# Patient Record
Sex: MALE | Race: WHITE | Employment: OTHER | URBAN - METROPOLITAN AREA
[De-identification: names, ages, dates, MRNs, and addresses within clinical notes are randomized per-mention and may not be internally consistent; named-entity substitution may affect disease eponyms.]

---

## 2017-01-01 ENCOUNTER — HOSPITAL ENCOUNTER (OUTPATIENT)
Dept: LAB | Age: 67
Discharge: HOME | End: 2017-02-14
Attending: INTERNAL MEDICINE
Payer: COMMERCIAL

## 2017-01-01 ENCOUNTER — HOSPITAL ENCOUNTER (INPATIENT)
Age: 67
LOS: 3 days | End: 2017-04-21
Attending: INTERNAL MEDICINE | Admitting: INTERNAL MEDICINE

## 2017-01-01 ENCOUNTER — HOSPICE ADMISSION (OUTPATIENT)
Dept: HOSPICE | Facility: HOSPICE | Age: 67
End: 2017-01-01
Payer: COMMERCIAL

## 2017-01-01 VITALS
TEMPERATURE: 96 F | HEART RATE: 101 BPM | RESPIRATION RATE: 10 BRPM | SYSTOLIC BLOOD PRESSURE: 89 MMHG | DIASTOLIC BLOOD PRESSURE: 64 MMHG

## 2017-01-01 DIAGNOSIS — C34.90 SMALL CELL CARCINOMA OF LUNG, UNSPECIFIED LATERALITY: ICD-10-CM

## 2017-01-01 LAB
ACID FAST STN SPEC: NORMAL
COLLECTION COMMENT, COLCM: NORMAL
MYCOBACTERIUM SPEC QL CULT: NEGATIVE
SPECIMEN PREPARATION: NORMAL
SPECIMEN SOURCE: NORMAL

## 2017-01-01 PROCEDURE — 74011250636 HC RX REV CODE- 250/636: Performed by: NURSE PRACTITIONER

## 2017-01-01 PROCEDURE — 74011000250 HC RX REV CODE- 250: Performed by: NURSE PRACTITIONER

## 2017-01-01 PROCEDURE — 74011250636 HC RX REV CODE- 250/636

## 2017-01-01 PROCEDURE — 87116 MYCOBACTERIA CULTURE: CPT | Performed by: INTERNAL MEDICINE

## 2017-01-01 PROCEDURE — 99231 SBSQ HOSP IP/OBS SF/LOW 25: CPT | Performed by: INTERNAL MEDICINE

## 2017-01-01 PROCEDURE — 74011250636 HC RX REV CODE- 250/636: Performed by: INTERNAL MEDICINE

## 2017-01-01 PROCEDURE — 0656 HSPC GENERAL INPATIENT

## 2017-01-01 PROCEDURE — 3336500001 HSPC ELECTION

## 2017-01-01 PROCEDURE — 36415 COLL VENOUS BLD VENIPUNCTURE: CPT | Performed by: INTERNAL MEDICINE

## 2017-01-01 RX ORDER — SODIUM CHLORIDE 0.9 % (FLUSH) 0.9 %
3 SYRINGE (ML) INJECTION AS NEEDED
Status: DISCONTINUED | OUTPATIENT
Start: 2017-01-01 | End: 2017-01-01

## 2017-01-01 RX ORDER — CLONAZEPAM 1 MG/1
1 TABLET ORAL
COMMUNITY

## 2017-01-01 RX ORDER — LEVOFLOXACIN 500 MG/1
500 TABLET, FILM COATED ORAL DAILY
Refills: 1 | COMMUNITY
Start: 2017-01-01

## 2017-01-01 RX ORDER — LORAZEPAM 2 MG/ML
1 INJECTION INTRAMUSCULAR
Status: DISCONTINUED | OUTPATIENT
Start: 2017-01-01 | End: 2017-01-01

## 2017-01-01 RX ORDER — LORAZEPAM 2 MG/ML
1 INJECTION INTRAMUSCULAR
Status: DISCONTINUED | OUTPATIENT
Start: 2017-01-01 | End: 2017-01-01 | Stop reason: HOSPADM

## 2017-01-01 RX ORDER — SODIUM CHLORIDE 0.9 % (FLUSH) 0.9 %
3 SYRINGE (ML) INJECTION EVERY 12 HOURS
Status: DISCONTINUED | OUTPATIENT
Start: 2017-01-01 | End: 2017-01-01

## 2017-01-01 RX ORDER — LORAZEPAM 1 MG/1
1-2 TABLET ORAL
Refills: 1 | COMMUNITY
Start: 2017-01-01

## 2017-01-01 RX ORDER — MORPHINE SULFATE 2 MG/ML
2 INJECTION, SOLUTION INTRAMUSCULAR; INTRAVENOUS
Status: DISCONTINUED | OUTPATIENT
Start: 2017-01-01 | End: 2017-01-01

## 2017-01-01 RX ORDER — MORPHINE SULFATE 30 MG/1
30 TABLET, FILM COATED, EXTENDED RELEASE ORAL 3 TIMES DAILY
Refills: 0 | COMMUNITY
Start: 2017-01-01

## 2017-01-01 RX ORDER — MORPHINE SULFATE 20 MG/ML
.25-1 SOLUTION ORAL
Refills: 0 | COMMUNITY
Start: 2017-01-01

## 2017-01-01 RX ORDER — MORPHINE SULFATE 2 MG/ML
2 INJECTION, SOLUTION INTRAMUSCULAR; INTRAVENOUS
Status: DISCONTINUED | OUTPATIENT
Start: 2017-01-01 | End: 2017-01-01 | Stop reason: HOSPADM

## 2017-01-01 RX ORDER — HALOPERIDOL 5 MG/ML
2 INJECTION INTRAMUSCULAR
Status: DISCONTINUED | OUTPATIENT
Start: 2017-01-01 | End: 2017-01-01

## 2017-01-01 RX ORDER — SIMVASTATIN 40 MG/1
40 TABLET, FILM COATED ORAL
Refills: 3 | COMMUNITY
Start: 2017-01-01

## 2017-01-01 RX ORDER — HYOSCYAMINE SULFATE 0.125 MG
1-2 TABLET ORAL
Refills: 1 | COMMUNITY
Start: 2017-01-01

## 2017-01-01 RX ORDER — ACETAMINOPHEN 650 MG/1
650 SUPPOSITORY RECTAL
Status: DISCONTINUED | OUTPATIENT
Start: 2017-01-01 | End: 2017-01-01 | Stop reason: HOSPADM

## 2017-01-01 RX ORDER — HALOPERIDOL 5 MG/ML
4 INJECTION INTRAMUSCULAR
Status: DISCONTINUED | OUTPATIENT
Start: 2017-01-01 | End: 2017-01-01 | Stop reason: HOSPADM

## 2017-01-01 RX ORDER — FACIAL-BODY WIPES
10 EACH TOPICAL AS NEEDED
Status: DISCONTINUED | OUTPATIENT
Start: 2017-01-01 | End: 2017-01-01 | Stop reason: HOSPADM

## 2017-01-01 RX ORDER — MORPHINE SULFATE 4 MG/ML
4 INJECTION, SOLUTION INTRAMUSCULAR; INTRAVENOUS
Status: DISCONTINUED | OUTPATIENT
Start: 2017-01-01 | End: 2017-01-01

## 2017-01-01 RX ORDER — METFORMIN HYDROCHLORIDE 500 MG/1
500 TABLET ORAL
COMMUNITY

## 2017-01-01 RX ORDER — MORPHINE SULFATE 15 MG/1
15 TABLET, FILM COATED, EXTENDED RELEASE ORAL DAILY
COMMUNITY

## 2017-01-01 RX ORDER — TRAZODONE HYDROCHLORIDE 100 MG/1
100 TABLET ORAL
COMMUNITY

## 2017-01-01 RX ORDER — NITROGLYCERIN 0.4 MG/1
0.4 TABLET SUBLINGUAL
COMMUNITY

## 2017-01-01 RX ORDER — MORPHINE SULFATE 4 MG/ML
4 INJECTION, SOLUTION INTRAMUSCULAR; INTRAVENOUS ONCE
Status: COMPLETED | OUTPATIENT
Start: 2017-01-01 | End: 2017-01-01

## 2017-01-01 RX ORDER — HALOPERIDOL 5 MG/ML
INJECTION INTRAMUSCULAR
Status: COMPLETED
Start: 2017-01-01 | End: 2017-01-01

## 2017-01-01 RX ORDER — CLOPIDOGREL BISULFATE 75 MG/1
75 TABLET ORAL DAILY
COMMUNITY

## 2017-01-01 RX ORDER — IPRATROPIUM BROMIDE 0.5 MG/2.5ML
0.5 SOLUTION RESPIRATORY (INHALATION)
Status: DISCONTINUED | OUTPATIENT
Start: 2017-01-01 | End: 2017-01-01 | Stop reason: HOSPADM

## 2017-01-01 RX ORDER — MORPHINE SULFATE 2 MG/ML
INJECTION, SOLUTION INTRAMUSCULAR; INTRAVENOUS
Status: COMPLETED
Start: 2017-01-01 | End: 2017-01-01

## 2017-01-01 RX ORDER — ASPIRIN 325 MG
325 TABLET ORAL DAILY
COMMUNITY

## 2017-01-01 RX ORDER — MORPHINE SULFATE 4 MG/ML
4 INJECTION, SOLUTION INTRAMUSCULAR; INTRAVENOUS
Status: DISCONTINUED | OUTPATIENT
Start: 2017-01-01 | End: 2017-01-01 | Stop reason: HOSPADM

## 2017-01-01 RX ORDER — GLYCOPYRROLATE 0.2 MG/ML
0.2 INJECTION INTRAMUSCULAR; INTRAVENOUS
Status: DISCONTINUED | OUTPATIENT
Start: 2017-01-01 | End: 2017-01-01 | Stop reason: HOSPADM

## 2017-01-01 RX ORDER — PROMETHAZINE HYDROCHLORIDE 25 MG/1
25 TABLET ORAL
COMMUNITY

## 2017-01-01 RX ORDER — HALOPERIDOL 5 MG/ML
4 INJECTION INTRAMUSCULAR EVERY 6 HOURS
Status: DISCONTINUED | OUTPATIENT
Start: 2017-01-01 | End: 2017-01-01 | Stop reason: HOSPADM

## 2017-01-01 RX ORDER — BISACODYL 5 MG
1-2 TABLET, DELAYED RELEASE (ENTERIC COATED) ORAL
Refills: 0 | COMMUNITY
Start: 2017-01-01

## 2017-01-01 RX ADMIN — MORPHINE SULFATE 2 MG: 2 INJECTION, SOLUTION INTRAMUSCULAR; INTRAVENOUS at 12:01

## 2017-01-01 RX ADMIN — LORAZEPAM 1 MG: 2 INJECTION INTRAMUSCULAR; INTRAVENOUS at 19:31

## 2017-01-01 RX ADMIN — HALOPERIDOL LACTATE 2 MG: 5 INJECTION, SOLUTION INTRAMUSCULAR at 01:09

## 2017-01-01 RX ADMIN — HALOPERIDOL LACTATE 2 MG: 5 INJECTION, SOLUTION INTRAMUSCULAR at 09:34

## 2017-01-01 RX ADMIN — MORPHINE SULFATE 4 MG: 4 INJECTION, SOLUTION INTRAMUSCULAR; INTRAVENOUS at 14:22

## 2017-01-01 RX ADMIN — HALOPERIDOL LACTATE 4 MG: 5 INJECTION, SOLUTION INTRAMUSCULAR at 17:50

## 2017-01-01 RX ADMIN — LORAZEPAM 1 MG: 2 INJECTION INTRAMUSCULAR; INTRAVENOUS at 12:08

## 2017-01-01 RX ADMIN — HALOPERIDOL LACTATE 2 MG: 5 INJECTION, SOLUTION INTRAMUSCULAR at 19:12

## 2017-01-01 RX ADMIN — MORPHINE SULFATE 4 MG: 4 INJECTION, SOLUTION INTRAMUSCULAR; INTRAVENOUS at 19:31

## 2017-01-01 RX ADMIN — HALOPERIDOL LACTATE 4 MG: 5 INJECTION, SOLUTION INTRAMUSCULAR at 12:00

## 2017-01-01 RX ADMIN — HALOPERIDOL LACTATE 2 MG: 5 INJECTION, SOLUTION INTRAMUSCULAR at 14:57

## 2017-01-01 RX ADMIN — MORPHINE SULFATE 2 MG: 2 INJECTION, SOLUTION INTRAMUSCULAR; INTRAVENOUS at 13:42

## 2017-01-01 RX ADMIN — GLYCOPYRROLATE 0.2 MG: 0.2 INJECTION INTRAMUSCULAR; INTRAVENOUS at 11:53

## 2017-01-01 RX ADMIN — MORPHINE SULFATE 2 MG: 2 INJECTION, SOLUTION INTRAMUSCULAR; INTRAVENOUS at 14:22

## 2017-01-01 RX ADMIN — HALOPERIDOL LACTATE 2 MG: 5 INJECTION, SOLUTION INTRAMUSCULAR at 05:41

## 2017-01-01 RX ADMIN — MORPHINE SULFATE 4 MG: 4 INJECTION, SOLUTION INTRAMUSCULAR; INTRAVENOUS at 03:38

## 2017-01-01 RX ADMIN — MORPHINE SULFATE 4 MG: 4 INJECTION, SOLUTION INTRAMUSCULAR; INTRAVENOUS at 07:26

## 2017-01-01 RX ADMIN — HALOPERIDOL LACTATE 2 MG: 5 INJECTION, SOLUTION INTRAMUSCULAR at 17:49

## 2017-01-01 RX ADMIN — MORPHINE SULFATE 4 MG: 4 INJECTION, SOLUTION INTRAMUSCULAR; INTRAVENOUS at 13:42

## 2017-01-01 RX ADMIN — MORPHINE SULFATE 4 MG: 4 INJECTION, SOLUTION INTRAMUSCULAR; INTRAVENOUS at 21:33

## 2017-01-01 RX ADMIN — MORPHINE SULFATE 4 MG: 4 INJECTION, SOLUTION INTRAMUSCULAR; INTRAVENOUS at 08:44

## 2017-01-01 RX ADMIN — HALOPERIDOL LACTATE 2 MG: 5 INJECTION, SOLUTION INTRAMUSCULAR at 11:53

## 2017-01-01 RX ADMIN — MORPHINE SULFATE 4 MG: 4 INJECTION, SOLUTION INTRAMUSCULAR; INTRAVENOUS at 21:43

## 2017-01-01 RX ADMIN — HALOPERIDOL LACTATE 2 MG: 5 INJECTION, SOLUTION INTRAMUSCULAR at 06:14

## 2017-01-01 RX ADMIN — MORPHINE SULFATE 4 MG: 4 INJECTION, SOLUTION INTRAMUSCULAR; INTRAVENOUS at 09:35

## 2017-01-01 RX ADMIN — MORPHINE SULFATE 4 MG: 4 INJECTION, SOLUTION INTRAMUSCULAR; INTRAVENOUS at 08:00

## 2017-01-01 RX ADMIN — MORPHINE SULFATE 4 MG: 4 INJECTION, SOLUTION INTRAMUSCULAR; INTRAVENOUS at 12:44

## 2017-01-01 RX ADMIN — MORPHINE SULFATE 4 MG: 4 INJECTION, SOLUTION INTRAMUSCULAR; INTRAVENOUS at 22:39

## 2017-01-01 RX ADMIN — MORPHINE SULFATE 4 MG: 4 INJECTION, SOLUTION INTRAMUSCULAR; INTRAVENOUS at 13:48

## 2017-01-01 RX ADMIN — LORAZEPAM 1 MG: 2 INJECTION INTRAMUSCULAR; INTRAVENOUS at 19:13

## 2017-01-01 RX ADMIN — MORPHINE SULFATE 4 MG: 4 INJECTION, SOLUTION INTRAMUSCULAR; INTRAVENOUS at 12:59

## 2017-01-01 RX ADMIN — MORPHINE SULFATE 4 MG: 4 INJECTION, SOLUTION INTRAMUSCULAR; INTRAVENOUS at 15:08

## 2017-01-01 RX ADMIN — MORPHINE SULFATE 4 MG: 4 INJECTION, SOLUTION INTRAMUSCULAR; INTRAVENOUS at 05:41

## 2017-01-01 RX ADMIN — HALOPERIDOL LACTATE 2 MG: 5 INJECTION, SOLUTION INTRAMUSCULAR at 21:33

## 2017-01-01 RX ADMIN — MORPHINE SULFATE 4 MG: 4 INJECTION, SOLUTION INTRAMUSCULAR; INTRAVENOUS at 16:14

## 2017-01-01 RX ADMIN — HALOPERIDOL LACTATE 2 MG: 5 INJECTION, SOLUTION INTRAMUSCULAR at 07:59

## 2017-01-01 RX ADMIN — MORPHINE SULFATE 2 MG: 2 INJECTION, SOLUTION INTRAMUSCULAR; INTRAVENOUS at 11:07

## 2017-01-01 RX ADMIN — MORPHINE SULFATE 4 MG: 4 INJECTION, SOLUTION INTRAMUSCULAR; INTRAVENOUS at 01:09

## 2017-01-01 RX ADMIN — HALOPERIDOL LACTATE 2 MG: 5 INJECTION, SOLUTION INTRAMUSCULAR at 11:12

## 2017-01-01 RX ADMIN — HALOPERIDOL LACTATE 4 MG: 5 INJECTION, SOLUTION INTRAMUSCULAR at 20:39

## 2017-01-01 RX ADMIN — LORAZEPAM 1 MG: 2 INJECTION INTRAMUSCULAR; INTRAVENOUS at 01:55

## 2017-01-01 RX ADMIN — MORPHINE SULFATE 4 MG: 4 INJECTION, SOLUTION INTRAMUSCULAR; INTRAVENOUS at 04:39

## 2017-01-01 RX ADMIN — MORPHINE SULFATE 4 MG: 4 INJECTION, SOLUTION INTRAMUSCULAR; INTRAVENOUS at 17:47

## 2017-01-01 RX ADMIN — MORPHINE SULFATE 2 MG: 2 INJECTION, SOLUTION INTRAMUSCULAR; INTRAVENOUS at 16:13

## 2017-01-01 RX ADMIN — MORPHINE SULFATE 4 MG: 4 INJECTION, SOLUTION INTRAMUSCULAR; INTRAVENOUS at 12:10

## 2017-01-01 RX ADMIN — LORAZEPAM 1 MG: 2 INJECTION INTRAMUSCULAR; INTRAVENOUS at 13:18

## 2017-01-01 RX ADMIN — HALOPERIDOL LACTATE 2 MG: 5 INJECTION, SOLUTION INTRAMUSCULAR at 12:58

## 2017-01-01 RX ADMIN — LORAZEPAM 1 MG: 2 INJECTION INTRAMUSCULAR; INTRAVENOUS at 03:06

## 2017-01-01 RX ADMIN — LORAZEPAM 1 MG: 2 INJECTION INTRAMUSCULAR; INTRAVENOUS at 21:04

## 2017-01-01 RX ADMIN — HALOPERIDOL LACTATE 2 MG: 5 INJECTION, SOLUTION INTRAMUSCULAR at 11:07

## 2017-01-01 RX ADMIN — HALOPERIDOL LACTATE 4 MG: 5 INJECTION, SOLUTION INTRAMUSCULAR at 21:40

## 2017-01-01 RX ADMIN — MORPHINE SULFATE 4 MG: 4 INJECTION, SOLUTION INTRAMUSCULAR; INTRAVENOUS at 08:13

## 2017-01-01 RX ADMIN — HALOPERIDOL LACTATE 2 MG: 5 INJECTION, SOLUTION INTRAMUSCULAR at 21:43

## 2017-01-01 RX ADMIN — MORPHINE SULFATE 2 MG: 2 INJECTION, SOLUTION INTRAMUSCULAR; INTRAVENOUS at 15:10

## 2017-01-01 RX ADMIN — MORPHINE SULFATE 4 MG: 4 INJECTION, SOLUTION INTRAMUSCULAR; INTRAVENOUS at 21:41

## 2017-01-01 RX ADMIN — MORPHINE SULFATE 4 MG: 4 INJECTION, SOLUTION INTRAMUSCULAR; INTRAVENOUS at 22:18

## 2017-01-01 RX ADMIN — LORAZEPAM 1 MG: 2 INJECTION INTRAMUSCULAR; INTRAVENOUS at 13:10

## 2017-01-01 RX ADMIN — HALOPERIDOL LACTATE 4 MG: 5 INJECTION, SOLUTION INTRAMUSCULAR at 05:55

## 2017-01-01 RX ADMIN — MORPHINE SULFATE 4 MG: 4 INJECTION, SOLUTION INTRAMUSCULAR; INTRAVENOUS at 05:56

## 2017-01-01 RX ADMIN — GLYCOPYRROLATE 0.2 MG: 0.2 INJECTION INTRAMUSCULAR; INTRAVENOUS at 01:09

## 2017-01-01 RX ADMIN — LORAZEPAM 1 MG: 2 INJECTION INTRAMUSCULAR; INTRAVENOUS at 08:13

## 2017-01-01 RX ADMIN — MORPHINE SULFATE 4 MG: 4 INJECTION, SOLUTION INTRAMUSCULAR; INTRAVENOUS at 14:56

## 2017-01-01 RX ADMIN — MORPHINE SULFATE 4 MG: 4 INJECTION, SOLUTION INTRAMUSCULAR; INTRAVENOUS at 13:19

## 2017-01-01 RX ADMIN — HALOPERIDOL LACTATE 4 MG: 5 INJECTION, SOLUTION INTRAMUSCULAR at 00:51

## 2017-01-01 RX ADMIN — MORPHINE SULFATE 4 MG: 4 INJECTION, SOLUTION INTRAMUSCULAR; INTRAVENOUS at 17:50

## 2017-01-01 RX ADMIN — MORPHINE SULFATE 4 MG: 4 INJECTION, SOLUTION INTRAMUSCULAR; INTRAVENOUS at 06:14

## 2017-01-01 RX ADMIN — MORPHINE SULFATE 4 MG: 4 INJECTION, SOLUTION INTRAMUSCULAR; INTRAVENOUS at 11:11

## 2017-01-01 RX ADMIN — MORPHINE SULFATE 4 MG: 4 INJECTION, SOLUTION INTRAMUSCULAR; INTRAVENOUS at 03:06

## 2017-01-01 RX ADMIN — LORAZEPAM 1 MG: 2 INJECTION INTRAMUSCULAR; INTRAVENOUS at 15:07

## 2017-01-01 RX ADMIN — MORPHINE SULFATE 4 MG: 4 INJECTION, SOLUTION INTRAMUSCULAR; INTRAVENOUS at 20:40

## 2017-01-01 RX ADMIN — MORPHINE SULFATE 4 MG: 4 INJECTION, SOLUTION INTRAMUSCULAR; INTRAVENOUS at 15:11

## 2017-01-01 RX ADMIN — MORPHINE SULFATE 4 MG: 4 INJECTION, SOLUTION INTRAMUSCULAR; INTRAVENOUS at 19:12

## 2017-01-01 RX ADMIN — MORPHINE SULFATE 4 MG: 4 INJECTION, SOLUTION INTRAMUSCULAR; INTRAVENOUS at 00:52

## 2017-01-01 RX ADMIN — HALOPERIDOL LACTATE 4 MG: 5 INJECTION, SOLUTION INTRAMUSCULAR at 04:39

## 2017-01-01 RX ADMIN — MORPHINE SULFATE 4 MG: 4 INJECTION, SOLUTION INTRAMUSCULAR; INTRAVENOUS at 01:56

## 2017-01-01 RX ADMIN — LORAZEPAM 1 MG: 2 INJECTION INTRAMUSCULAR; INTRAVENOUS at 08:43

## 2017-01-01 RX ADMIN — HALOPERIDOL LACTATE 2 MG: 5 INJECTION, SOLUTION INTRAMUSCULAR at 13:49

## 2017-01-01 RX ADMIN — MORPHINE SULFATE 4 MG: 4 INJECTION, SOLUTION INTRAMUSCULAR; INTRAVENOUS at 13:10

## 2017-01-01 RX ADMIN — HALOPERIDOL LACTATE 4 MG: 5 INJECTION, SOLUTION INTRAMUSCULAR at 16:12

## 2017-01-01 RX ADMIN — LORAZEPAM 1 MG: 2 INJECTION INTRAMUSCULAR; INTRAVENOUS at 07:26

## 2017-01-01 RX ADMIN — HALOPERIDOL LACTATE 2 MG: 5 INJECTION, SOLUTION INTRAMUSCULAR at 22:39

## 2017-01-01 RX ADMIN — MORPHINE SULFATE 4 MG: 4 INJECTION, SOLUTION INTRAMUSCULAR; INTRAVENOUS at 11:53

## 2017-01-01 RX ADMIN — HALOPERIDOL LACTATE 2 MG: 5 INJECTION, SOLUTION INTRAMUSCULAR at 12:45

## 2017-01-01 RX ADMIN — GLYCOPYRROLATE 0.2 MG: 0.2 INJECTION INTRAMUSCULAR; INTRAVENOUS at 22:44

## 2017-01-01 RX ADMIN — HALOPERIDOL LACTATE 2 MG: 5 INJECTION, SOLUTION INTRAMUSCULAR at 03:38

## 2017-03-31 NOTE — PROGRESS NOTES
Spoke with wife. Patient is not seeing  ID and has cancelled all MD appts. He is on hospice care at home for Lung CA. Wife is aware she can call us at any time if there is anything we can do for . Destiny Jose Manuel.

## 2017-04-18 PROBLEM — C34.90 SMALL CELL CARCINOMA OF LUNG (HCC): Status: ACTIVE | Noted: 2017-01-01

## 2017-04-18 PROBLEM — E78.5 HYPERLIPIDEMIA: Status: ACTIVE | Noted: 2017-01-01

## 2017-04-18 PROBLEM — F43.10 NEUROSIS, POSTTRAUMATIC: Status: ACTIVE | Noted: 2017-01-01

## 2017-04-18 PROBLEM — J84.10 FIBROSIS OF LUNG (HCC): Status: ACTIVE | Noted: 2017-01-01

## 2017-04-18 PROBLEM — G62.9 NEUROPATHY: Status: ACTIVE | Noted: 2017-01-01

## 2017-04-18 PROBLEM — C34.90 SMALL CELL LUNG CARCINOMA (HCC): Status: ACTIVE | Noted: 2017-01-01

## 2017-04-18 PROBLEM — C34.90 SQUAMOUS CELL CARCINOMA OF LUNG (HCC): Status: ACTIVE | Noted: 2017-01-01

## 2017-04-18 PROBLEM — D64.9 ANEMIA: Status: ACTIVE | Noted: 2017-01-01

## 2017-04-18 PROBLEM — J18.9 PNEUMONIA: Status: ACTIVE | Noted: 2017-01-01

## 2017-04-18 PROBLEM — R73.9 HYPERGLYCEMIA: Status: ACTIVE | Noted: 2017-01-01

## 2017-04-18 NOTE — H&P
History and Physical    Patient: Doyle Pelletier Sr. MRN: [id number]  SSN: [id number]    YOB: 1950  Age: 77 y.o. Sex: male      Subjective:      Angelic Alfonso is a 77 y.o. male who has a history of metastatic small cell lung cancer involving both lungs and mediastinum. He also has a history of pulmonary fibrosis caused by radiation therapy. He was admitted to the hospital with post obstructive pneumonia. He was obtunded and required a non-rebreather mask to maintain his oxygen levels. His wife has decided to stop IV fluids and IV antibiotics. Without further treatment, his life expectancy is less than 2 weeks. Due to his recent decline, his family has elected to forgo further medical treatment and pursue comfort measures with hospice care. Hospice associated diagnoses include COPD, CAD and CABG. Patient admitted [facility] with metastatic small cell lung cancer for management of dyspnea, cough and agitation.         Past Medical History:   Diagnosis Date    Acute urinary retention 5/20/2016    GLORIA (acute kidney injury) ([facility]) 5/20/2016    CAD (coronary artery disease) 5/17/2016 5/18/16 (Dr Jacob Shepherd) CORONARY ARTERY BYPASS GRAFT (CABG X3)/ LIMA, LEFT GSV         Chest wall pain 2/9/2016    Chronic pain     from history of lung cancer    COPD (chronic obstructive pulmonary disease) ([facility]) 5/18/2016    Debility 5/24/2016    Diabetes mellitus type 2, controlled ([facility]) 5/17/2016    Dyslipidemia 5/17/2016    HTN (hypertension) 5/18/2016    Hyperlipidemia 4/14/2017    Hypotension 9/27/2016    Last Assessment & Plan:  Patient's having dizziness and likely hypotension and will stop Lisinopril    Hypoxemia 11/19/2015    Last Assessment & Plan:  atient would like to obtainportable oxygen concentratorand [facility]'ll [facility]    Hypoxia 5/18/2016    Kidney stone     Malignant neoplasm of lung ([facility]) 5/17/2016    Neuropathy 4/14/2017    other     Benign breast lumps    Pulmonary infiltrates on CXR 6/1/2016    S/P CABG x 3 5/18/2016    S/P PTCA (percutaneous transluminal coronary angioplasty) 5/17/2016    RCA STENT 2007     Sleep apnea     patient sleeps with O2    Tobacco abuse 5/17/2016    Weight loss 11/19/2015     Past Surgical History:   Procedure Laterality Date    APPENDECTOMY  1955    CHEST SURGERY PROCEDURE UNLISTED Bilateral     lung    HX COLECTOMY  1986    HX COLONOSCOPY      HX CORONARY ARTERY BYPASS GRAFT  5/18/2016    x3 - Dr. Somers Huge  [facility]    HX HERNIA REPAIR      HX OTHER SURGICAL  [id number]    Left lung cancer    HX OTHER SURGICAL      left leg nerve severed to help with leg pain    HX OTHER SURGICAL      bronchoscopy with biopsy of mediastinal nodes    HX OTHER SURGICAL  2014    power port a cath-removed per spouse    HX THORACOTOMY      2002    HX TONSILLECTOMY        Family History   Problem Relation Age of Onset    Diabetes Mother     High Cholesterol Mother     Heart Disease Mother     Hypertension Mother     Heart Disease Father     Hypertension Father     Elevated Lipids Sister      Social History   Substance Use Topics    Smoking status: Former Smoker     Packs/day: 1.00     Years: 40.00     Types: Cigarettes     Quit date: 2/1/2010    Smokeless tobacco: Never Used    Alcohol use 0.0 oz/week     0 Standard drinks or equivalent per week      Comment: rarely      Prescriptions Prior to Admission   Medication Sig    bisacodyl (DULCOLAX) 5 mg EC tablet Take 1-2 Tabs by mouth two (2) times daily as needed for Constipation.  hyoscyamine (ANASPAZ, LEVSIN) 0.125 mg tablet Take 1-2 Tabs by mouth every four (4) hours as needed for Secretions.  levoFLOXacin (LEVAQUIN) 500 mg tablet Take 500 mg by mouth daily. X 10 days    LORazepam (ATIVAN) 1 mg tablet Take 1-2 Tabs by mouth every four (4) hours as needed for Agitation.     morphine (ROXANOL) 100 mg/5 mL (20 mg/mL) concentrated solution Take 0.25-1 mL by mouth every one (1) hour as needed for Pain or Shortness of Breath.  morphine CR (MS CONTIN) 30 mg CR tablet Take 30 mg by mouth three (3) times daily. Takes 30mg in am and pm, and 45mg (30mg+15mg) at 1400    simvastatin (ZOCOR) 40 mg tablet Take 40 mg by mouth nightly.  aspirin (ASPIRIN) 325 mg tablet Take 325 mg by mouth daily.  NUT. TX.GLUC INTOL,LF,SOY/FIBER (BOOST GLUCOSE CONTROL PO) Take 1 Bottle by mouth as needed.  clonazePAM (KLONOPIN) 1 mg tablet Take 1 mg by mouth nightly as needed.  clopidogrel (PLAVIX) 75 mg tab Take 75 mg by mouth daily.  metFORMIN (GLUCOPHAGE) 500 mg tablet Take 500 mg by mouth daily (with breakfast).  nitroglycerin (NITROSTAT) 0.4 mg SL tablet 0.4 mg by SubLINGual route every five (5) minutes as needed for Chest Pain.  promethazine (PHENERGAN) 25 mg tablet Take 25 mg by mouth every six (6) hours as needed for Nausea.  traZODone (DESYREL) 100 mg tablet Take 100 mg by mouth nightly.  morphine CR (MS CONTIN) 15 mg CR tablet Take 15 mg by mouth daily. Takes 30mg in am and pm with 45mg (30mg +15mg) at 1400    pregabalin (LYRICA) 150 mg capsule Take 150 mg by mouth. 1 qam 1 at noon, 2 qhs    HYDROcodone-acetaminophen (NORCO)  mg tablet Take 1 Tab by mouth every six (6) hours as needed.  tiotropium (SPIRIVA WITH HANDIHALER) 18 mcg inhalation capsule Take 1 Cap by inhalation daily.  albuterol (PROVENTIL HFA, VENTOLIN HFA, PROAIR HFA) 90 mcg/actuation inhaler Take 2 Puffs by inhalation every four (4) hours as needed. (Patient taking differently: Take 2 Puffs by inhalation every six (6) hours as needed for Wheezing or Shortness of Breath.)    cloNIDine HCl (CATAPRES) 0.1 mg tablet Take 0.1 mg by mouth nightly.  sertraline (ZOLOFT) 100 mg tablet Take 1 Tab by mouth daily.             Allergies   Allergen Reactions    Adhesive Anaphylaxis and Swelling     Blisters     Fentanyl Shortness of Breath    Gabapentin Itching  Iodinated Contrast Media - Oral And Iv Dye Rash    Penicillins Hives    Sulfa (Sulfonamide Antibiotics) Nausea and Vomiting       Review of Systems:  Review of systems not obtained due to patient factors. Objective:        Physical Exam:  GENERAL: fatigued, uncooperative, moderate distress, appears stated age, cachectic  LUNG: Coarse breath sounds with rhonchi and labored respirations. Audible secretions. HEART: regular rate and rhythm, S1, S2 normal, no murmur, click, rub or gallop. Tachycardic  ABDOMEN: Concave, soft, non-tender. Bowel sounds hypoactive. No masses,  no organomegaly  : Brown catheter inserted on admission with alexander urine noted. EXTREMITIES:  extremities normal, atraumatic, no cyanosis or edema, no edema, redness or tenderness in the calves or thighs. + pulses. SKIN: purpura / ecchymosis noted on trunk and extremities. Warm to touch. NEUROLOGIC: Agitated, nonverbal. Unable to answer questions or participate in exam. Bedbound.   PSYCHIATRIC: agitated    Assessment:     Hospital Problems  Date Reviewed: 4/18/2017          Codes Class Noted POA    * (Principal)[facility] lung carcinoma ([facility] ICD-10-CM: C34.90  ICD-9-CM: 162.9  4/18/2017 Unknown    Overview Signed 4/18/2017 11:06 AM by Maddie Chow NP     Metastasis to Both lungs and mediastinum             Small cell carcinoma of lung ([facility] ICD-10-CM: C34.90  ICD-9-CM: 162.9  4/18/2017 Unknown              Plan:     Current Facility-Administered Medications   Medication Dose Route Frequency    glycopyrrolate (ROBINUL) injection 0.2 mg  0.2 mg SubCUTAneous Q4H PRN    haloperidol lactate (HALDOL) injection 2 mg  2 mg IntraVENous Q1H PRN    Or    haloperidol lactate (HALDOL) injection 2 mg  2 mg SubCUTAneous Q1H PRN    acetaminophen (TYLENOL) suppository 650 mg  650 mg Rectal Q3H PRN    bisacodyl (DULCOLAX) suppository 10 mg  10 mg Rectal PRN    sodium chloride (NS) flush 3 mL  3 mL IntraVENous Q12H    sodium chloride (NS) flush 3 mL  3 mL IntraVENous PRN    morphine injection 4 mg  4 mg SubCUTAneous Q20MIN PRN    Or    morphine injection 4 mg  4 mg IntraVENous Q20MIN PRN    ipratropium (ATROVENT) 0.02 % nebulizer solution 0.5 mg  0.5 mg Nebulization Q4H PRN    LORazepam (ATIVAN) injection 1 mg  1 mg IntraVENous Q4H PRN    Or    LORazepam (ATIVAN) injection 1 mg  1 mg IntraMUSCular Q4H PRN       Admitted [facility] with metastatic small cell lung cancer for management of dyspnea, cough and agitation.      1. Dyspnea: Morphine as ordered. Nebulizers prn. Glycopyrrolate prn secretions. Oxygen prn.    2. Cough: Morphine as ordered. 3. Agitation: Haloperidol and Lorazepam as ordered. 4. Family/Pt Support: Family at bedside during exam. Medications and plan of care discussed with nursing staff and family. Will continue to monitor for symptoms and adjust medications as needed to maintain patient comfort. PPS 10%. Case discussed with Dr. Leonarda Vizcaino.       Signed By: Ludmila Yun NP     April 18, 2017

## 2017-04-18 NOTE — PROGRESS NOTES
Hospice Psychosocial Initial Assessment   [place] Status:[place] Vet. Gerda Pickett Full Coverage  Type of Assessment: Initial Evaluation  Discipline of person completing the assess: LMSW  Principal Problem:Lung cancer with mets  Individuals participating in interview//assessment process (name and relationship): [address] large family  If in a relationship, name of partner/spouse? Leticia  Duration of relationship: 30 yrs  Does the spouse/partner function as the primary caregiver? YES  Does the patient live alone: NO  Members of the household spouse and patient  Name Age Relationship Actively Involved in Care : Four sons: Mack Pratt, and Citizen of [place]  Social Support System: Excellent social support system which includes three or more willing family members or friends  Abuse/Neglect (actual/potential risks): NA  Mental Status: Comatose; Responds to  Painful stimuli  Does the patient or family have any concerns about the patient's mental status? NO  Emotional Status  Patient: mood/affect stable and appropriate  Caregiver: mood/affect stable and appropriate  Significant Behavioral Changes Noted with members of the household other than the patient: none observed  Leisure time management/hobbies/interests: listening to music and reading  Financial/Employment Status  Current employment status: NA  Has the patient's illness/condition forced a change in his/her employment status? no  Has the patient's illness/condition forced a change in the employment status of the spouse or significant other? NO  Patient's annual income level: Patient refuses to provide information  Has the patient's income status changed as a result of health status: NO  Financial needs: NO  The patient needs the following services: NA  Handling finances:  Total assistance unable to manage his/her own financial affairs  Goals/Plans  Goals of hospice care expressed by patient: not responding  Goals of hospice care expressed by spouse/significant other: comfort care  Pain Management  Does the patient/family express or observed signs/symptoms of any pain/issues that need to be reported to the ? NO  If yes, what time was the  notified? na  End of Life Decisions/Planning: Advanced Directives  Status of plans for /final arrangements: Plans complete  Plans/desires/requests for final arrangements/ communicated with: Family  End of life notes: Patient has completed application  and been accepted to donate his body to the [address] [place] [address] program.. Caleb Meaghan Charge nurse has the phone #  Remaining life goals: comfort care  Survivor Risk Assessment: optimal suppor  Indicate the actual and potential risks to the bereavement process: low needs  Risk notes: low  Is spiritual well being essential to patient/caregiver? Asked and discussion occurred   Spiritual notes: [employer] rose but not active in [employer]  Narrative :  Patient is a 73yo  male admitted to [place] [address] with metastatic lung cancer. He lives with his spouse of 30 years (would be 32 yrs next week), Leticia. The couple have two children from their marriage, Lawrence Singh and Pro. Patient also has two sons from a previous marriage, Dora and Issa. All of the children live locally and are supportive. The couple are of the [address] [place] [address] but have no [employer]. Patient is a [facility].   HE is also a body donor with [facility] body donation program.

## 2017-04-18 NOTE — PROGRESS NOTES
Situation:  Support provided to family members gathered in family room near patient room while staff making initial assessment and settling patient in after arrival at hospice house. Patient's spouse, mother, eldest and youngest sons, and a nephew present at beginning of encounter. Another one of patient's 4 sons arrived mid-encounter, and was tearful upon entering room. Patient's youngest son immediately began to comfort this other son. A fourth son was expected to arrive shortly. GITA Barakat entered room near close of encounter, and reported GITA Dunham was with patient. Background:  Patient is a 77year old MWM with dx of lung cancer. Per patient's spouse, this is patient's 3rd bout with cancer, and patient elected not to pursue treatment this time. 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 Patient's spouse and mother reported with pride that patient has tattoos commemorating service and memorializing those who  in [employer].  Patient's spouse reports patient had been with another hospice for in-home care, but patient comes to the hospice house from the hospital.  Patient's spouse and mother both reported having had other family members cared for at [facility] during the time preceding and of their death, and family reported desire for patient to also be cared for at [facility]. Family reports that patient has a [employer] background, but family has not been actively connected with a community of rose for some time. Assessment/Response:  Support provided to family through active listening and some life review, and words of encouragement.  explained a bit about [facility]'s team approach to patient/family care, and stressed that staff desires to care for both patient and family members.  also explained briefly about bereavement care through [facility]. Family seemed very receptive to spiritual care/support.       Plan of Care:  Spiritual/emotional support to be provided to patient/family as needed, requested, or referred.

## 2017-04-19 NOTE — PROGRESS NOTES
The shift change rounds completed with the off going RN and report was taken. The patient was identified by name and date of birth. The patient is resting quietly in the bed with no signs of distress observed. Pain =0, no signs of anxiety, SOB or other distress observed at this time. The bed is low and locked and the side rails are up times 2 for safety. Will continue to monitor.

## 2017-04-19 NOTE — PROGRESS NOTES
Assumed care from off going RN. Walking rounds done with report. Pt I'd by name and . Pt calm. No distress. No facial grimace. Flacc 0/10. Respirations  unlabored with oxygen in place via nonrebreather. SR up x2. Bed low/locked. Call light with in reach. Door opened. Tab alerts on.

## 2017-04-19 NOTE — PROGRESS NOTES
The patient is anxious. He is tossing around on the bed. He has a grimace on his face and is moaning. Lorazepam 1 mg IM for agitation. Morphine 2 mg SC for pain and dyspnea. The patient was turned and repositioned in the bed for comfort. The stat lock was replaced on the patient's leg and the bass bag drained of 450 ml alexander urine. He has two tab alerts in place. Will continue to monitor.

## 2017-04-19 NOTE — PROGRESS NOTES
Patient was medicated for pain and agitation. He is pulling at his bass, is SOB and is very restless at this time. Family remains at the bedside. Will continue to monitor.

## 2017-04-19 NOTE — PROGRESS NOTES
The patient is restless and is moaning and grimacing. His son is at the bedside and the family are all following the directions for low stimulation in the patient room. Medicated for pain and dyspnea. See MAR. Will continue to monitor.

## 2017-04-19 NOTE — PROGRESS NOTES
The patient is now resting quietly in the bed with no signs of distress observed. Respirations are even and unlabored and face is relaxed. Will continue to monitor.

## 2017-04-19 NOTE — PROGRESS NOTES
Resting with eyes closed. Respirations non labored with non rebreather intact. Facial features flat,relaxed. Required prn's overnight to control agitation and dyspnea. Family in with pt. Bed in low and locked position with side rails up x2 and call light in reach.

## 2017-04-19 NOTE — PROGRESS NOTES
Patient is tossing around in the bed. He is very agitated and is moaning and groaning. He is very SOB. Medicated via IV see MAR. Family assisted with repositioning the patient.

## 2017-04-19 NOTE — PROGRESS NOTES
Patient is very restless and tossing in the bed. He is pulling off the oxygen mast. Replaced the mask with an Oxymizer at 10 L after consulting with Donell PATEL. Administered Morphine for dyspnea and Haldol for agitation. The family was educated on not over stimulating the patient. They voiced understanding. Will continue to monitor the patient . Shana Melendrez

## 2017-04-19 NOTE — ROUTINE PROCESS
Pt I'd by name and . semi responsive. Agitated, restless. Facial grimace noted. Flacc =3. Resp non labored on 10L  oximizer. .Lungs with rhonchi. . BS diminished. HR irreg. No edema noted at this time. Brown cath draining alexander urine. Morphine 4mg and haldol 2mg given sq. Pt repositioned. SR up x2. Bed low/locked. Call light with in reach. Tab alerts on. Family at the bedside.

## 2017-04-19 NOTE — PROGRESS NOTES
The patient continues to toss around in the bed. Lorazepam IM for agitation and Morphine SQ for dyspnea. The family members are following the advice of low stimulation in the patient's room. Will continue to monitor.

## 2017-04-20 NOTE — ROUTINE PROCESS
Pt restless, agitated. Facial grimace. Flacc =3. Wet lung sounds noted. Resp non labored on 10L oximizer. Morphine 4mg, Haldol 2mg and glycopyrrolate 0.2mg given sq in left upper arm. Pt repositioned. SR up x2. Bed low/locked. Call light with in reach. Tab alerts on. Family at the bedside.

## 2017-04-20 NOTE — PROGRESS NOTES
Pt agitated, dyspneic and gurgling, administered lorazepam and morphine. Physical assessment completed. Pt gurgling, bounding pulse, pedal pulses present and lower extremities warm. Blood pressure 115/69 p 185 per automatic blood pressure machine. Informed family of assessment findings.

## 2017-04-20 NOTE — ROUTINE PROCESS
Pt calm. No distress. No agitation noted at this time. No facial grimace. Flacc =0-1. Resp non labored on 10L oximizer. Unable to obtain VS.  VS would not register. Pt with mottling on knees and feet. Brown cath emptied of 600 cc alexander urine. Family at bedside notified of changes. Calling other family members. SR up x2. Bed low/locked. Call light with in reach. Tab alerts on. Family at the bedside.

## 2017-04-20 NOTE — ROUTINE PROCESS
Pt restless, agitated. Pulling at covers and tab alert. Facial grimace noted. Flacc =3. Morphine 4mg  and Haldol 2mg  given sq in right upper arm. Resp non labored on 10L oximizer. SR up x2. Bed low/locked. Call light with in reach. Tab x 2 on. Family at the bedside.

## 2017-04-20 NOTE — ROUTINE PROCESS
Pt continues with facial grimace. Restlessness. Resp non labored on 10L oximizer. Morphine 4mg sq given in left upper arm. SR up x2. Bed low/locked. Call light with in reach. Family at the bedside.

## 2017-04-20 NOTE — ROUTINE PROCESS
Family repositioning pt. Pt agitated. No facial grimace. Flacc =0-1. Ativan 1mg IM given in left upper arm. Completed repositioning pt with family. Resp non labored on 10L oximizer. Tab x 2 on. SR up x2. Bed low/locked. Call light with in reach. Family at the bedside.

## 2017-04-20 NOTE — ROUTINE PROCESS
Pt calm. No distress. No agitation noted at this time. No facial grimace. Flacc =0-1. Resp non labored on 10L oximizer. SR up x2. Bed low/locked. Call light with in reach. Tab alerts on. Family at the bedside.

## 2017-04-20 NOTE — PROGRESS NOTES
Report received from off-going nurse, visual identification made, assumed care of pt. Pt resting quietly with eyes closed, no agitation or restlessness, no grimacing or groaning. Pt respirations unlabored. Tab alert in place, rails up x 2, bed in lowest position, safety maintained. FLACC 0. Multiple family members at bedside, including pt's wife and four sons.

## 2017-04-20 NOTE — ROUTINE PROCESS
Pt restless, agitated. Pulling at covers and tab alert. Facial grimace noted. Flacc =3. Morphine 4mg sq and ativan 1mg IM given in right upper arm. Resp non labored on 10L oximizer. SR up x2. Bed low/locked. Call light with in reach. Tab x 2 on. Family at the bedside.

## 2017-04-20 NOTE — PROGRESS NOTES
Pt agitated and restless with flacc of 5/10. Pt wife Twin Bahena states that pt's pain was under control at home with scheduled medication. She was wondering what the plan was now. States they seemed to have pain under control with that schedule.

## 2017-04-20 NOTE — ROUTINE PROCESS
Pt agitated, restless. Facial grimace noted. Flacc =3. Haldol 2mg and morphine 4mg given sq in right upper arm. Tab alerts on. SR up x2. Bed low/locked. Call light with in reach. Family at the bedside.

## 2017-04-20 NOTE — PROGRESS NOTES
Progress Note    Patient: Beryle Bathe Sr. MRN: [id number]  SSN: [id number]    YOB: 1950  Age: 77 y.o. Sex: male      Admit Date: 4/18/2017    LOS: 2 days     Clinical Summary:     Mister block to be is lying in bed with her responses. Jose Xochitl He is in no distress. His lungs are mildly congested and his heart rhythm is regular and rapid. Abdomen is benign  Vitals:    04/19/17 1752 04/20/17 0806   BP: 128/70 115/69   Pulse:  (!) 185   Temp: 97.6 °F (36.4 °C)             Clinical Assessment:     Principal Problem:    Small cell lung carcinoma (HCC) (4/18/2017)      Overview: Metastasis to Both lungs and mediastinum    Active Problems:    Small cell carcinoma of lung ([address] (4/18/2017)        Treatment Plan:      I have reviewed the patient's Plan of Care with the nursing staff. I reviewed the situation with family present. I have stressed that he appears to be comfortable. I think that will ensued within the week. There are no indications for changes to his current plan of treatment.           Current Facility-Administered Medications   Medication Dose Route Frequency    glycopyrrolate (ROBINUL) injection 0.2 mg  0.2 mg SubCUTAneous Q4H PRN    haloperidol lactate (HALDOL) injection 2 mg  2 mg SubCUTAneous Q1H PRN    acetaminophen (TYLENOL) suppository 650 mg  650 mg Rectal Q3H PRN    bisacodyl (DULCOLAX) suppository 10 mg  10 mg Rectal PRN    morphine injection 4 mg  4 mg SubCUTAneous Q20MIN PRN    ipratropium (ATROVENT) 0.02 % nebulizer solution 0.5 mg  0.5 mg Nebulization Q4H PRN    LORazepam (ATIVAN) injection 1 mg  1 mg IntraMUSCular Q4H PRN           Signed By: Shmuel Lay MD     April 20, 2017

## 2017-04-20 NOTE — ROUTINE PROCESS
Pt calm. No distress. No facial grimace. Flacc =0-1. Resp non labored on 10L oximizer. SR up x2. Bed low/locked. Tab alerts on. Family at the bedside.

## 2017-04-21 NOTE — PROGRESS NOTES
Summary report: Pt with terminal agitation. Required several doses of PRN medications for pain and agitation. Pt was able to sleep for a while in between the medications. Pt had episodes where he would work really hard to breathe using accessory muscles but PRNs morphine was able to ease it off. Mottling has started to pt's feet. Pt turned and repositioned. Wife remained at the bedside.

## 2017-04-21 NOTE — PROGRESS NOTES
Called the [employer] and spoke with Vania Henderson, states one of his associates Ms Giron John will come and speak with the wife and inspect the pt to make sure he is appropriate for the program.

## 2017-04-21 NOTE — PROGRESS NOTES
Pt dyspneic with periods of apnea, administered morphine sub q and called xiomara per family request

## 2017-04-21 NOTE — HSPC IDG VOLUNTEER NOTES
[address] Review     Status Codes I = Initiated C=Continued R=Revised RS = Resolved     I Volunteer     Goal: Hospice house volunteer (s) enhances the quality of remaining life while patient is at the hospice house. Interventions: Torri Chacon Volunteer (s) will provide companionship to the patient and/or family by visiting at the hospice house       . Torri Chacon Volunteer (s) will provide respite as needed when requested by patient and/or family. Torri Henderson  Volunteer will provide activities such as music, reading, pet therapy, etc. as requested. Torri Henderson  Comfort bag delivered. Any other special requests or information regarding volunteer services: Three visits recorded for prayer, head massage, and family companionship. No further needs identified at this time. These notes have been discussed in [facility] meeting.

## 2017-04-21 NOTE — HSPC IDG SOCIAL WORKER NOTES
Patient: Domi Zhao Sr.    Date: 04/21/17  Time: 1:32 PM    \[facility]\""  Notes:   Spouse denies needs; They appear to be coping adequately currently but are exhausted.  Continue to offer availability and support          Signed by: Marilee Ross LMSW

## 2017-04-21 NOTE — PROGRESS NOTES
Administered morphine for pain and lorazepam for agitation. Physical assessment completed. Explained to wife the assessment findings of irregular heart rate, pedal pulses palpable, pt pale.

## 2017-04-21 NOTE — HSPC IDG NURSE NOTES
Patient: Carlton Hope Sr.    Date: 04/21/17  Time: 1:13 PM    [address] [place] [address] Nurse Notes    1st IDG since GIP admission to [facility]; patient unable to take oral medications and requiring injectable medications to manage symptoms; PRN glyco for secretion management; scheduled and PRN haldol to manage agitation; PRN ativan for agitation and morphine for pain and dyspnea; morphine titrated up to 6mg to better manage dyspnea - unable to use fentanyl as family describes patient had allergy; originally on admission, the patient was on a non-rebreather and since changed to 10L, attempting to wean lower as tolerated to minimize flow of oxymizer; patient is a body donation to [id number]  - pre-approval paperwork on chart and SW aware to manage when patient dies; family prepared for the chance that he is not accepted at that time; ongoing family support as they are vascillating at times with     Goals of care: effectiveness of symptom management continuously evaluated to achieve optimum comfort and ultimately a peaceful death        Signed by: Sonia Mccullough

## 2017-04-21 NOTE — PROGRESS NOTES
Pt non-responsive, not breathing and no heart rate, xiomarasaritha Simon informed. Multiple family members at bedside, including wife.

## 2017-04-21 NOTE — PROGRESS NOTES
provided support for family following patient's death. All the family was present and very tearful but appropriate. Stories shared about the . The family smiled through their tears.  offered scripture and words of comfort along with prayer.

## 2017-04-21 NOTE — PROGRESS NOTES
Report received from off-going nurse, visual identification made, assumed care of pt. Pt resting quietly with eyes closed, no agitation or restlessness, no grimacing or groaning. Pt respirations unlabored. Tab alert in place, rails up x 2, bed in lowest position, safety maintained. FLACC 0. Wife at bedside.

## 2017-04-21 NOTE — PROGRESS NOTES
Report received from day shift RN. Pt in bed unresponsive. Identified by name and . Pt agitated at this time. Throwing his hands to the bed rails. Pt also working hard to breathe with apneic episodes. PRN ativan and morphine given as ordered. safety measures in place. Family very attentive to pt.

## 2017-04-21 NOTE — HSPC IDG MASTER NOTE
Hospice Interdisciplinary Group Collaborative  Date: 04/24/17  Time: 12:03 PM    ___________________    Patient: Rand Olguin Sr.    ___________________    Diagnoses: The encounter diagnosis was Small cell carcinoma of lung, unspecified laterality ([facility] [address] Current Medications:  No current facility-administered medications for this encounter. Current Outpatient Prescriptions:     bisacodyl (DULCOLAX) 5 mg EC tablet, Take 1-2 Tabs by mouth two (2) times daily as needed for Constipation. , Disp: , Rfl: 0    hyoscyamine (ANASPAZ, LEVSIN) 0.125 mg tablet, Take 1-2 Tabs by mouth every four (4) hours as needed for Secretions. , Disp: , Rfl: 1    levoFLOXacin (LEVAQUIN) 500 mg tablet, Take 500 mg by mouth daily. X 10 days, Disp: , Rfl: 1    LORazepam (ATIVAN) 1 mg tablet, Take 1-2 Tabs by mouth every four (4) hours as needed for Agitation. , Disp: , Rfl: 1    morphine (ROXANOL) 100 mg/5 mL (20 mg/mL) concentrated solution, Take 0.25-1 mL by mouth every one (1) hour as needed for Pain or Shortness of Breath., Disp: , Rfl: 0    morphine CR (MS CONTIN) 30 mg CR tablet, Take 30 mg by mouth three (3) times daily. Takes 30mg in am and pm, and 45mg (30mg+15mg) at 1400, Disp: , Rfl: 0    simvastatin (ZOCOR) 40 mg tablet, Take 40 mg by mouth nightly., Disp: , Rfl: 3    aspirin (ASPIRIN) 325 mg tablet, Take 325 mg by mouth daily. , Disp: , Rfl:     NUT. TX.GLUC INTOL,LF,SOY/FIBER (BOOST GLUCOSE CONTROL PO), Take 1 Bottle by mouth as needed. , Disp: , Rfl:     clonazePAM (KLONOPIN) 1 mg tablet, Take 1 mg by mouth nightly as needed. , Disp: , Rfl:     clopidogrel (PLAVIX) 75 mg tab, Take 75 mg by mouth daily. , Disp: , Rfl:     metFORMIN (GLUCOPHAGE) 500 mg tablet, Take 500 mg by mouth daily (with breakfast). , Disp: , Rfl:     nitroglycerin (NITROSTAT) 0.4 mg SL tablet, 0.4 mg by SubLINGual route every five (5) minutes as needed for Chest Pain., Disp: , Rfl:     promethazine (PHENERGAN) 25 mg tablet, Take 25 mg by mouth every six (6) hours as needed for Nausea., Disp: , Rfl:     traZODone (DESYREL) 100 mg tablet, Take 100 mg by mouth nightly., Disp: , Rfl:     morphine CR (MS CONTIN) 15 mg CR tablet, Take 15 mg by mouth daily. Takes 30mg in am and pm with 45mg (30mg +15mg) at 1400, Disp: , Rfl:     pregabalin (LYRICA) 150 mg capsule, Take 150 mg by mouth. 1 qam 1 at noon, 2 qhs, Disp: , Rfl:     HYDROcodone-acetaminophen (NORCO)  mg tablet, Take 1 Tab by mouth every six (6) hours as needed. , Disp: , Rfl:     tiotropium (SPIRIVA WITH HANDIHALER) 18 mcg inhalation capsule, Take 1 Cap by inhalation daily. , Disp: 90 Cap, Rfl: 4    albuterol (PROVENTIL HFA, VENTOLIN HFA, PROAIR HFA) 90 mcg/actuation inhaler, Take 2 Puffs by inhalation every four (4) hours as needed. (Patient taking differently: Take 2 Puffs by inhalation every six (6) hours as needed for Wheezing or Shortness of Breath.), Disp: 1 Inhaler, Rfl: 11    cloNIDine HCl (CATAPRES) 0.1 mg tablet, Take 0.1 mg by mouth nightly., Disp: , Rfl:     sertraline (ZOLOFT) 100 mg tablet, Take 1 Tab by mouth daily. , Disp: 30 Tab, Rfl: 0    Orders:  Orders Placed This Encounter    IP CONSULT TO SPIRITUAL CARE Once on week one, then PRN. Once on week one, then PRN. Standing Status:   Standing     Number of Occurrences:   1     Order Specific Question:   Reason for Consult: Answer:   Spiritual crisis intervention or per patient or caregiver request    NON REBREATHER MASK     Standing Status:   Standing     Number of Occurrences:   1     Order Specific Question:   FIO2     Answer:   100%    bisacodyl (DULCOLAX) 5 mg EC tablet     Sig: Take 1-2 Tabs by mouth two (2) times daily as needed for Constipation. Refill:  0    hyoscyamine (ANASPAZ, LEVSIN) 0.125 mg tablet     Sig: Take 1-2 Tabs by mouth every four (4) hours as needed for Secretions. Refill:  1    levoFLOXacin (LEVAQUIN) 500 mg tablet     Sig: Take 500 mg by mouth daily.  X 10 days Refill:  1    LORazepam (ATIVAN) 1 mg tablet     Sig: Take 1-2 Tabs by mouth every four (4) hours as needed for Agitation. Refill:  1    morphine (ROXANOL) 100 mg/5 mL (20 mg/mL) concentrated solution     Sig: Take 0.25-1 mL by mouth every one (1) hour as needed for Pain or Shortness of Breath. Refill:  0    morphine CR (MS CONTIN) 30 mg CR tablet     Sig: Take 30 mg by mouth three (3) times daily. Takes 30mg in am and pm, and 45mg (30mg+15mg) at 1400     Refill:  0    simvastatin (ZOCOR) 40 mg tablet     Sig: Take 40 mg by mouth nightly. Refill:  3    aspirin (ASPIRIN) 325 mg tablet     Sig: Take 325 mg by mouth daily.  NUT. TX.GLUC INTOL,LF,SOY/FIBER (BOOST GLUCOSE CONTROL PO)     Sig: Take 1 Bottle by mouth as needed.  clonazePAM (KLONOPIN) 1 mg tablet     Sig: Take 1 mg by mouth nightly as needed.  clopidogrel (PLAVIX) 75 mg tab     Sig: Take 75 mg by mouth daily.  metFORMIN (GLUCOPHAGE) 500 mg tablet     Sig: Take 500 mg by mouth daily (with breakfast).  nitroglycerin (NITROSTAT) 0.4 mg SL tablet     Si.4 mg by SubLINGual route every five (5) minutes as needed for Chest Pain.  promethazine (PHENERGAN) 25 mg tablet     Sig: Take 25 mg by mouth every six (6) hours as needed for Nausea.  DISCONTD: morphine injection 2 mg    DISCONTD: morphine injection 2 mg    DISCONTD: glycopyrrolate (ROBINUL) injection 0.2 mg    DISCONTD: haloperidol lactate (HALDOL) injection 2 mg    DISCONTD: haloperidol lactate (HALDOL) injection 2 mg    morphine 2 mg/mL injection     Abel Ta-Christine   : cabinet override    haloperidol lactate (HALDOL) 5 mg/mL injection     Abel Ta-Christine   : cabinet override    traZODone (DESYREL) 100 mg tablet     Sig: Take 100 mg by mouth nightly.     morphine injection 4 mg    DISCONTD: acetaminophen (TYLENOL) suppository 650 mg    DISCONTD: bisacodyl (DULCOLAX) suppository 10 mg    DISCONTD: sodium chloride (NS) flush 3 mL    DISCONTD: sodium chloride (NS) flush 3 mL    DISCONTD: morphine injection 4 mg    DISCONTD: morphine injection 4 mg    DISCONTD: ipratropium (ATROVENT) 0.02 % nebulizer solution 0.5 mg     Order Specific Question:   MODE OF DELIVERY     Answer:   Nebulizer    DISCONTD: LORazepam (ATIVAN) injection 1 mg    DISCONTD: LORazepam (ATIVAN) injection 1 mg    morphine CR (MS CONTIN) 15 mg CR tablet     Sig: Take 15 mg by mouth daily. Takes 30mg in am and pm with 45mg (30mg +15mg) at 1400    DISCONTD: haloperidol lactate (HALDOL) injection 4 mg    DISCONTD: morphine injection 4 mg    DISCONTD: morphine injection 2 mg    DISCONTD: haloperidol lactate (HALDOL) injection 4 mg    DISCONTD: morphine injection 4 mg    DISCONTD: morphine injection 2 mg    INITIAL PHYSICIAN ORDER: HOSPICE Level Of Care: General; Reason for Admission: Admitted GIP with metastatic small cell lung cancer for management of dyspnea, cough and agitation     Standing Status:   Standing     Number of Occurrences:   1     Order Specific Question:   Status     Answer:   Hospice     Order Specific Question:   Level Of Care     Answer:   General     Order Specific Question:   Reason for Admission     Answer:   Admitted GIP with metastatic small cell lung cancer for management of dyspnea, cough and agitation     Order Specific Question:   Inpatient Hospitalization Certified Necessary for the Following Reasons     Answer:   3. Patient receiving treatment that can only be provided in an inpatient setting (further clarification in H&P documentation)     Order Specific Question:   Admitting Diagnosis     Answer:   Small cell carcinoma of lung [facility]) [[id number]]     Order Specific Question:   Terminal Prognosis Diagnosis(es)     Answer:   Small cell carcinoma of lung [facility]) [[id number]]     Order Specific Question:   Admitting Physician     Answer:   aSrath Bronson [[id number]]     Order Specific Question:   Attending Physician     Answer:   Sarath Bronson [[id number]]       Allergies:   Allergies Allergen Reactions    Adhesive Anaphylaxis and Swelling     Blisters     Fentanyl Shortness of Breath    Gabapentin Itching    Iodinated Contrast Media - Oral And Iv Dye Rash    Penicillins Hives    Sulfa (Sulfonamide Antibiotics) Nausea and Vomiting       ___________________    Care Team Notes          POC/IDG Notes      [facility] IDG  Notes by Rush Berg LMSW at 04/21/17 1332  Version 1 of 1    Author:  Rush Berg LMSW Service:  (none) Author Type:  Licensed Masters in Social Work    Filed:  04/21/17 1335 Date of Service:  04/21/17 1332 Status:  Signed    :  Rush Berg LMSW (Licensed Masters in Social Work)           Patient: Angie Pires Sr.    Date: 04/21/17  Time: 1:32 PM    [facility]  Notes:   Spouse denies needs; They appear to be coping adequately currently but are exhausted.  Continue to offer availability and support          Signed by: Rush Berg LMSW       [facility] IDG Nurse Notes by Francheska Power at 04/21/17 1313  Version 1 of 1    Author:  Francheska Power Service:  Ronnie Simpson Author Type:  Registered Nurse    Filed:  04/21/17 1325 Date of Service:  04/21/17 1313 Status:  Signed    :  Francheska Power (Registered Nurse)           Patient: Angie Pires Sr.    Date: 04/21/17  Time: 1:13 PM    [facility] Nurse Notes    1st IDG since GIP admission to [facility]; patient unable to take oral medications and requiring injectable medications to manage symptoms; PRN glyco for secretion management; scheduled and PRN haldol to manage agitation; PRN ativan for agitation and morphine for pain and dyspnea; morphine titrated up to 6mg to better manage dyspnea - unable to use fentanyl as family describes patient had allergy; originally on admission, the patient was on a non-rebreather and since changed to 10L, attempting to wean lower as tolerated to minimize flow of oxymizer; patient is a body donation to [id number]  - pre-approval paperwork on chart and SW aware to manage when patient dies; family prepared for the chance that he is not accepted at that time; ongoing family support as they are vascillating at times with     Goals of care: effectiveness of symptom management continuously evaluated to achieve optimum comfort and ultimately a peaceful death        Signed by: Stephanie Castaneda       [facility] IDG Volunteer Notes by Mirna Diaz at 04/21/17 1311  Version 1 of 1    Author:  Mirna Diaz Service:  Desire Clark Author Type:  Hospice Volunteer/    Filed:  04/21/17 1311 Date of Service:  04/21/17 1311 Status:  Signed    :  Mirna Diaz (Hospice Volunteer/)               [address] Care Review     Status Codes I = Initiated C=Continued R=Revised RS = Resolved     I Volunteer     Goal: Hospice house volunteer (s) enhances the quality of remaining life while patient is at the hospice house. Interventions: Ellis Schmidt Volunteer (s) will provide companionship to the patient and/or family by visiting at the hospice house       . Ellis Schmidt Volunteer (s) will provide respite as needed when requested by patient and/or family. Ellis Lauren  Volunteer will provide activities such as music, reading, pet therapy, etc. as requested. Ellis Mandujano Ast  Comfort bag delivered. Any other special requests or information regarding volunteer services: Three visits recorded for prayer, head massage, and family companionship. No further needs identified at this time. These notes have been discussed in [facility] meeting.        [facility] IDG  Notes by Maxim Cam at 04/21/17 1139  Version 1 of 1    Author:  Maxim Cam Service:  Spiritual Care Author Type:  Pastoral Care    Filed:  04/21/17 1240 Date of Service:  04/21/17 1139 Status:  Signed    :  Maxim Cam (Pastoral Care)           Patient: Oniel Almanzar Sr.    Date: 04/21/17  Time: 11:39 AM    [facility]  Notes    Chaplain Frank Nelson provided care during an initial assessment 4/18/17. Chaplain Ameena Rascon made a visit 4/18/17. Patient was very uncomfortable at the time. Family asked if we had a hat patient could wear.  sought out the  to see if we had a hat. She found two and  took it to the patient. While  was there they placed one of the hats on his head. He said it felt good.  assured family of continued support.  to follow up for and do further assessment.          Signed by: Sharmaine Lee

## 2017-04-21 NOTE — PROGRESS NOTES
provided end of life support for patient and family with scripture, singing of hymns and prayer. A large number of family members are gathered around the bed.  to continue to bee available for support throughout the evening as death is anticipated in the near future.

## 2017-04-21 NOTE — PROGRESS NOTES
Pt dyspneic and color is grey, administered morphine for dyspnea, multiple family members at bedside, Dr Tennille Garcia at bedside.

## 2017-04-21 NOTE — HOSPICE
Section O: Service Utilization    () Level of care in final 3 days  Complete only if , Reason for Discharge= 01       Did the patient receive Continuous Home Care, General Inpatient Care, or Respite      Care during any of the final 3 days of life?  GIP

## 2017-04-21 NOTE — PROGRESS NOTES
Administered lorazepam for agitation and morphine for dyspnea, wife, Terry Ward states they have discussed the oxygen and have decided to discontinue the oxygen. Discontinued oxygen as ordered per NP Anila.

## 2017-04-21 NOTE — PROGRESS NOTES
To Whom it May Concern: The grandfather of Jyoti Glover, Izabella Emmanuel and Deana Montoya passed away today 4/21/2017 at the [facility] in [place], [place].      Zoe Sánchez RN  [address] [place] [address].  [address]    [phone]

## 2017-04-21 NOTE — PROGRESS NOTES
FMLA forms were completed and faxed to human resources for spouse. A copy was placed in the chart. Spouse remains at the bedside. Emotional support and availability offered.

## 2017-04-21 NOTE — PROGRESS NOTES
Pt had a restless day, her was medicated 6 times with morphine for pain, 4 times with haldol for agitation and 2 times with ativan for anxiety. Night shift was unable to get a blood pressure on pt and the family was called in, but pt blood pressure this morning was 115/69, with pulse of 185. Pt had a little mottling this morning but is just pale with a palpable pedal pulse later in the day. Pt had a large family at bedside this morning but with stabilization of assessment findings only one son is at bedside at this time.

## 2017-04-21 NOTE — PROGRESS NOTES
Progress Note    Patient: Beryle Bathe Sr. MRN: [id number]  SSN: [id number]    YOB: 1950  Age: 77 y.o. Sex: male      Admit Date: 4/18/2017    LOS: 3 days     Subjective:     Pt is intermittently agitated with periods of tachypnea and dyspnea. Wife and children from blended family at bedside. They are worried about a variety of issues related to his ongoing agitation and question how long he will last.     Review of Systems:  Review of systems not obtained due to patient factors. Objective:     Vitals:    04/19/17 1752 04/20/17 0806 04/20/17 1500 04/21/17 0430   BP: 128/70 115/69 134/76 (!) 89/64   Pulse:  (!) 185 (!) 124 (!) 101   Resp:   10 10   Temp: 97.6 °F (36.4 °C)  96.6 °F (35.9 °C) 96 °F (35.6 °C)        Intake and Output:  Current Shift:    Last three shifts: 04/19 1901 - 04/21 0700  In: -   Out: 7410 [Urine:1450]    Physical Exam:   GENERAL: delirious, mild distress, appears stated age, cachectic  LUNG: rhonchi right greater than left, diminished breath sounds   HEART: irregularly irregular rhythm  ABDOMEN: soft, non-tender. Bowel sounds normal. No masses,  no organomegaly  SKIN: Multiple areas of bruising. NEUROLOGIC: Minimally responsive. Lab/Data Review:  No new labs resulted in the last 24 hours.       Assessment:     Principal Problem:    Small cell lung carcinoma (HCC) (4/18/2017)      Overview: Metastasis to Both lungs and mediastinum    Active Problems:    Small cell carcinoma of lung (HCC) (4/18/2017)        Plan:     Current Facility-Administered Medications   Medication Dose Route Frequency    haloperidol lactate (HALDOL) injection 4 mg  4 mg SubCUTAneous Q6H    morphine injection 4 mg  4 mg SubCUTAneous Q20MIN PRN    And    morphine injection 2 mg  2 mg SubCUTAneous Q20MIN PRN    haloperidol lactate (HALDOL) injection 4 mg  4 mg SubCUTAneous Q1H PRN    glycopyrrolate (ROBINUL) injection 0.2 mg  0.2 mg SubCUTAneous Q4H PRN    acetaminophen (TYLENOL) suppository 650 mg  650 mg Rectal Q3H PRN    bisacodyl (DULCOLAX) suppository 10 mg  10 mg Rectal PRN    ipratropium (ATROVENT) 0.02 % nebulizer solution 0.5 mg  0.5 mg Nebulization Q4H PRN    LORazepam (ATIVAN) injection 1 mg  1 mg IntraMUSCular Q4H PRN     Lengthy discussion with wife and family at bedside discussing ongoing plan of care. They are all in agreement to turning oxygen off. Pt actively nearing his demise since oxygen discontinued. IDG Team aware. PPS 10%. Family at bedside.     Signed By: Esteban Wayne NP     April 21, 2017

## 2017-04-21 NOTE — HSPC IDG CHAPLAIN NOTES
Patient: Adilia Barakat Sr.    Date: 04/21/17  Time: 11:39 AM    \[facility]\""  Notes    Chaplain Kailee Christianson provided care during an initial assessment 4/18/17. leighton Hernandez Si made a visit 4/18/17. Patient was very uncomfortable at the time. Family asked if we had a hat patient could wear.  sought out the  to see if we had a hat. She found two and  took it to the patient. While  was there they placed one of the hats on his head. He said it felt good.  assured family of continued support.  to follow up for and do further assessment.          Signed by: Madison Leung

## 2017-04-22 PROCEDURE — 0656 HSPC GENERAL INPATIENT

## 2017-04-23 PROCEDURE — 0656 HSPC GENERAL INPATIENT

## 2017-04-24 NOTE — HOSPICE
Section O: Service Utilization    () Level of care in final 3 days  Complete only if , Reason for Discharge= 01       Did the patient receive Continuous Home Care, General Inpatient Care, or Respite      Care during any of the final 3 days of life?  Yes.     (if yes, then skip to , Signature(s) of Person(s) Completing the Record)

## 2017-04-24 NOTE — PROGRESS NOTES
Brief encounter in patient room after patient death. Stan Newsome provided primary support to family after patient death, and this  offered condolences and expressed sympathy to family members, in particular to patient's spouse and patient's mother, whom this  had met and supported at initial spiritual care encounter.

## 2017-04-24 NOTE — HSPC IDG BEREAVEMENT NOTES
Patient death and family bereavement needs discussed at [facility]. Bereavement risk factors indicate a bereavement risk score of LOW . Bereavement follow up to be provided accordingly.